# Patient Record
(demographics unavailable — no encounter records)

---

## 2024-11-19 NOTE — PHYSICAL EXAM
[de-identified] : Constitutional: Well-developed, in no acute distress  Musculoskeletal: Cervical Spine:    Gait: Antalgic Inspection: Normal curvature, no abnormal kyphosis or scoliosis  Facet loading: pain bilaterally  Palpation: Cervical paraspinal muscles: pain bilaterally 		 Muscle Strength: Deltoid: 5/5 bilaterally Biceps: 5/5 bilaterally Triceps: 5/5 bilaterally Adductor pollicis: 5/5 bilaterally  Sensation: normal and equal in bilateral upper extremities  Extremity: no edema noted Neurological: Memory normal, AAO x 3, Cranial nerves II - XII grossly normal Psychiatric: Appropriate mood and affect, oriented to time, place, person, and situation

## 2024-11-19 NOTE — REASON FOR VISIT
[Initial Consultation] : an initial pain management consultation [FreeTextEntry2] : Ref Micaela from vascular

## 2024-11-19 NOTE — PHYSICAL EXAM
[de-identified] : Constitutional: Well-developed, in no acute distress  Musculoskeletal: Cervical Spine:    Gait: Antalgic Inspection: Normal curvature, no abnormal kyphosis or scoliosis  Facet loading: pain bilaterally  Palpation: Cervical paraspinal muscles: pain bilaterally 		 Muscle Strength: Deltoid: 5/5 bilaterally Biceps: 5/5 bilaterally Triceps: 5/5 bilaterally Adductor pollicis: 5/5 bilaterally  Sensation: normal and equal in bilateral upper extremities  Extremity: no edema noted Neurological: Memory normal, AAO x 3, Cranial nerves II - XII grossly normal Psychiatric: Appropriate mood and affect, oriented to time, place, person, and situation

## 2024-11-19 NOTE — ASSESSMENT
[FreeTextEntry1] : 71 yof w/ neck and arm pain.  Physical therapy prescribed - goal will be to increase ROM, strengthening, postural training, other modalities ad gabe which may include massage and stim. Goals of therapy discussed with the patient in detail and will be discussed with physical therapist. Patient will follow-up following course of physical therapy to monitor progress and adjust therapy as needed.  Acetaminophen 1,000 mg q8h prn for moderate pain. Risks, benefits, and alternatives of acetaminophen discussed with patient.  Ibuprofen 600 mg q8h prn add when pain is not adequately controlled with acetaminophen. Risks, benefits, and alternatives of ibuprofen discussed with patient.  Diet and nutritional strategies discussed which may improve patients pain and will improve overall health.  If no relief with PT plan for MRI.

## 2024-11-19 NOTE — HISTORY OF PRESENT ILLNESS
[Neck Pain] : neck pain [___ mths] : [unfilled] month(s) ago [6] : a maximum pain level of 6/10 [Dull] : dull [Aching] : aching [Burning] : burning [Lifting] : lifting [Medications] : medications [Paroxysmal] : paroxysmal [FreeTextEntry1] : HPI: Ms. KELLY DENNIS is a 71 year F with pmhx of HTN. Complaints of neck pain that radiates to bilateral shoulders and down the left arm at times. Biofreeze and NSAIDs prn have been helpful. Pain can be severe and impacting her quality of life. Denies any additional weakness, numbness, bowel/bladder dysfunction, history of bleeding disorders. Quality of life is impaired. There has been a severe exacerbation of the patient's chronic pain.  [FreeTextEntry4] : aleve , advil

## 2025-07-02 NOTE — ASSESSMENT
[FreeTextEntry1] : 71 year old female new patient here to establish care. History of hypertension, bilateral knee arthritis (left >right). Medical records to be scanned in chart.  Orthopedic referral for b/l (L>R) knee OA.

## 2025-07-02 NOTE — HEALTH RISK ASSESSMENT
[Never] : Never [0] : 2) Feeling down, depressed, or hopeless: Not at all (0) [PHQ-2 Negative - No further assessment needed] : PHQ-2 Negative - No further assessment needed [[id number]] : 0

## 2025-07-02 NOTE — HISTORY OF PRESENT ILLNESS
[FreeTextEntry8] : 71 year old female new patient here to establish care and discuss hypertension. Prior PCP retired. She provides medical records for review.  Reports history of hypertension, bilateral knee arthritis (left >right).   States currently on lisinopril-hydrochlorothiazide 20-12.5mg daily but has been taking every other day since running low on prescription.  She works in [employer] and ambulatory BPs typically 130s/80s. Thinks BP is elevated today at 150/80 due to taking meds every other day. Denies any chest pain, shortness of breath, lower extremity edema.   Today she is requesting medication regimen changes as diuretic causes too much urination and at times she feel incontinent at work.   Also reports bilateral knee pains likely due to arthritis. Left knee worse than right. Has never seen orthopedics in past but had abnormal xray and knee MRIs in past. She is hesitant to undergo surgery since  had complications from incorrect type of hip surgery in past.